# Patient Record
Sex: MALE | URBAN - METROPOLITAN AREA
[De-identification: names, ages, dates, MRNs, and addresses within clinical notes are randomized per-mention and may not be internally consistent; named-entity substitution may affect disease eponyms.]

---

## 2020-04-06 ENCOUNTER — EMERGENCY (EMERGENCY)
Facility: HOSPITAL | Age: 55
LOS: 1 days | Discharge: HOME | End: 2020-04-06
Admitting: EMERGENCY MEDICINE
Payer: COMMERCIAL

## 2020-04-06 VITALS
DIASTOLIC BLOOD PRESSURE: 80 MMHG | TEMPERATURE: 99 F | HEART RATE: 85 BPM | RESPIRATION RATE: 18 BRPM | SYSTOLIC BLOOD PRESSURE: 130 MMHG | OXYGEN SATURATION: 99 %

## 2020-04-06 PROCEDURE — 93010 ELECTROCARDIOGRAM REPORT: CPT

## 2020-04-06 PROCEDURE — 71045 X-RAY EXAM CHEST 1 VIEW: CPT | Mod: 26

## 2020-04-06 PROCEDURE — 99283 EMERGENCY DEPT VISIT LOW MDM: CPT

## 2020-04-06 NOTE — ED PROVIDER NOTE - OBJECTIVE STATEMENT
53 yo male with no significant medical history c/o body ache low grade fever and chills e and headache cough x 2 days and chest pain. Pt also reported decrease appetite but no abdominal pain, nausea or vomiting.  Pt denies and known sick contact.

## 2020-04-06 NOTE — ED PROVIDER NOTE - NSFOLLOWUPINSTRUCTIONS_ED_ALL_ED_FT
F/U with PCP  You received verbal instructions and did not sign because of the risk of infection, and expressed understanding of the discharge instructions. Please followup with your doctor (call) and consider follow up in his/her office. Please practice good hand hygiene and social distancing. If fever, cough, shortness of breath, or any worse symptoms return to the ER.  If you have symptoms, consideration to quarantine yourself for 14 days from start of symptoms should be considered.  You can call [phone] for any Covid related questions or your local department of health. ([facility] Dept [facility] Health [phone], or [facility]). ECG no comparison at this time,  Pt to F/U with PCP   You received verbal instructions and did not sign because of the risk of infection, and expressed understanding of the discharge instructions. Please followup with your doctor (call) and consider follow up in his/her office. Please practice good hand hygiene and social distancing. If fever, cough, shortness of breath, or any worse symptoms return to the ER.  If you have symptoms, consideration to quarantine yourself for 14 days from start of symptoms should be considered.  You can call [phone] for any Covid related questions or your local department of health. ([facility] Dept of Health [phone], or [facility]).

## 2020-04-06 NOTE — ED PROVIDER NOTE - CLINICAL SUMMARY MEDICAL DECISION MAKING FREE TEXT BOX
cough x 2 days and chest pain. Pt also reported decrease appetite but no abdominal pain, nausea or vomiting.  Pt denies and known sick contact. EXG, encourage hydration, OTC Tylenol, Self Quarantine cough x 2 days and chest pain none at this andres. Pt also reported decrease appetite but no abdominal pain, nausea or vomiting.  Pt denies and known sick contact. EKG, CXR- negative, encourage hydration, OTC Tylenol, Self Quarantine

## 2020-04-06 NOTE — ED PROVIDER NOTE - PATIENT PORTAL LINK FT
You can access the FollowMyHealth Patient Portal offered by [facility] by registering at the following website: [email or web address]. By joining [employer]’s FollowMyHealth portal, you will also be able to view your health information using other applications (apps) compatible with our system.

## 2020-04-06 NOTE — ED PROVIDER NOTE - PLAN OF CARE
55 yo male with no significant medical history c/o body ache low grade fever and chills e and headache cough x 2 days and chest pain. Pt also reported decrease appetite but no abdominal pain, nausea or vomiting.  Pt denies and known sick contact. EXG, encourage hydration, OTC Tylenol, Self Quarantine 53 yo male with no significant medical history c/o body ache low grade fever and chills e and headache cough x 2 days and chest pain. Pt also reported decrease appetite but no abdominal pain, nausea or vomiting.  Pt denies and known sick contact. EXG, CXR, encourage hydration, OTC Tylenol, Self Quarantine

## 2020-04-06 NOTE — ED PROVIDER NOTE - CARE PLAN
Principal Discharge DX:	Viral syndrome  Assessment and plan of treatment:	55 yo male with no significant medical history c/o body ache low grade fever and chills e and headache cough x 2 days and chest pain. Pt also reported decrease appetite but no abdominal pain, nausea or vomiting.  Pt denies and known sick contact. EXG, encourage hydration, OTC Tylenol, Self Quarantine Principal Discharge DX:	Viral syndrome  Assessment and plan of treatment:	55 yo male with no significant medical history c/o body ache low grade fever and chills e and headache cough x 2 days and chest pain. Pt also reported decrease appetite but no abdominal pain, nausea or vomiting.  Pt denies and known sick contact. EXG, CXR, encourage hydration, OTC Tylenol, Self Quarantine